# Patient Record
Sex: MALE | Race: WHITE | NOT HISPANIC OR LATINO | Employment: OTHER | URBAN - METROPOLITAN AREA
[De-identification: names, ages, dates, MRNs, and addresses within clinical notes are randomized per-mention and may not be internally consistent; named-entity substitution may affect disease eponyms.]

---

## 2023-07-13 DIAGNOSIS — N18.32 STAGE 3B CHRONIC KIDNEY DISEASE: Primary | ICD-10-CM

## 2023-07-13 RX ORDER — GABAPENTIN 100 MG/1
100 CAPSULE ORAL 2 TIMES DAILY
COMMUNITY

## 2023-07-13 RX ORDER — METOPROLOL TARTRATE 25 MG/1
25 TABLET, FILM COATED ORAL 2 TIMES DAILY
COMMUNITY

## 2023-07-13 RX ORDER — FUROSEMIDE 20 MG/1
20 TABLET ORAL DAILY
COMMUNITY

## 2023-07-13 RX ORDER — TAMSULOSIN HYDROCHLORIDE 0.4 MG/1
0.4 CAPSULE ORAL DAILY
COMMUNITY

## 2023-07-13 RX ORDER — PRIMIDONE 50 MG/1
1 TABLET ORAL DAILY
COMMUNITY
Start: 2023-02-13

## 2023-07-13 RX ORDER — ROSUVASTATIN CALCIUM 5 MG/1
5 TABLET, COATED ORAL DAILY
COMMUNITY

## 2023-07-13 RX ORDER — ASPIRIN 81 MG/1
81 TABLET ORAL DAILY
COMMUNITY

## 2023-07-13 RX ORDER — AMIODARONE HYDROCHLORIDE 100 MG/1
100 TABLET ORAL DAILY
COMMUNITY
Start: 2023-02-13

## 2023-07-13 RX ORDER — DAPAGLIFLOZIN 5 MG/1
5 TABLET, FILM COATED ORAL DAILY
COMMUNITY

## 2023-07-13 RX ORDER — CLOPIDOGREL BISULFATE 75 MG/1
75 TABLET ORAL DAILY
COMMUNITY

## 2023-07-13 RX ORDER — EZETIMIBE 10 MG/1
10 TABLET ORAL DAILY
COMMUNITY

## 2023-10-17 ENCOUNTER — TELEPHONE (OUTPATIENT)
Dept: NEPHROLOGY | Facility: CLINIC | Age: 83
End: 2023-10-17
Payer: MEDICARE

## 2023-12-11 ENCOUNTER — OFFICE VISIT (OUTPATIENT)
Dept: NEPHROLOGY | Facility: CLINIC | Age: 83
End: 2023-12-11
Payer: MEDICARE

## 2023-12-11 VITALS
HEART RATE: 55 BPM | HEIGHT: 63 IN | WEIGHT: 154 LBS | OXYGEN SATURATION: 97 % | BODY MASS INDEX: 27.29 KG/M2 | DIASTOLIC BLOOD PRESSURE: 75 MMHG | SYSTOLIC BLOOD PRESSURE: 133 MMHG

## 2023-12-11 DIAGNOSIS — N18.32 STAGE 3B CHRONIC KIDNEY DISEASE: Primary | ICD-10-CM

## 2023-12-11 PROCEDURE — 99203 PR OFFICE/OUTPT VISIT, NEW, LEVL III, 30-44 MIN: ICD-10-PCS | Mod: S$GLB,,, | Performed by: INTERNAL MEDICINE

## 2023-12-11 PROCEDURE — 99203 OFFICE O/P NEW LOW 30 MIN: CPT | Mod: S$GLB,,, | Performed by: INTERNAL MEDICINE

## 2023-12-11 RX ORDER — IPRATROPIUM BROMIDE 42 UG/1
2 SPRAY, METERED NASAL 3 TIMES DAILY
COMMUNITY
Start: 2023-10-13

## 2023-12-11 RX ORDER — EPINEPHRINE 0.22MG
30 AEROSOL WITH ADAPTER (ML) INHALATION DAILY
COMMUNITY

## 2023-12-11 RX ORDER — LANOLIN ALCOHOL/MO/W.PET/CERES
1 CREAM (GRAM) TOPICAL
COMMUNITY

## 2023-12-11 NOTE — PROGRESS NOTES
Nephrology Clinic Note           Pt Name:  Alma Rodriguez  Pt :  1940  Pt MRN:  [id number]    Date: 2023  Provider: Yordan Garrido      Chief Complaint: No chief complaint on file.      HPI:  Alma Rodriguez is a 83 y.o. 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  History is significant for HTN, CAD, s/p CABG, DM, afib. He is coming here for 1st time referred from his PCP for abnormal renal function. From chart review he has had abnormal renal function for 1 year at least. The patient declares that he knows for it for more than 1 year. Prolong h/o DM - recently with A1c - 8.4 referred to see a DM specialist, only on Farxiga, Metformin was recently stopped. Does not check Bp at home often but reports good control of it. No NSAIDS.  Frequent urination during the day. On Flomax and Lasix.   No swelling of his extremities.  Today in the office, no shortness of breath, no chest pain, no fever, no dysuria, no hematuria, no swelling of her legs, no diarrhea, or constipation          Review of Systems   Gen: no fever, chills, fatigue, weight loss/gain  HEENT: no vision changes, no hearing deficits, no nasal discharge  Respiratory: no cough, dyspnea  CVS: no chest pain, PND, orthopnea  Abd: no nausea, vomiting, abdominal pain, diarrhea, constipation  : no hematuria, dysuria, urinary retention  Ext: no edema, joint and muscle pains  Neuro: no weakness, no numbness  Skin: no rashes, no lesions  Psych: no depression, no anxiety    History:   Past Medical History:   Diagnosis Date    Diverticulosis     Essential (primary) hypertension     Internal hemorrhoids     Mixed hyperlipidemia     Personal history of colonic polyps 2011    adenomatous,hyperplastic    Personal history of colonic polyps 2008    hyperplastic    Personal history of colonic polyps 2018    2 Tubular adenoma    Type 2 diabetes mellitus without complications      Past Surgical History:   Procedure Laterality Date     APPENDECTOMY      CARDIAC SURGERY      CAROTID ENDARTERECTOMY Right 2019    COLONOSCOPY      6/07/2018, 4/12/2011, 3/4/2008    CORONARY STENT PLACEMENT  04/26/2011    TONSILLECTOMY       Family History   Problem Relation Age of Onset    Diabetes Mother     Diabetes Father      Social History     Substance and Sexual Activity   Alcohol Use Not Currently     Social History     Substance and Sexual Activity   Drug Use Not Currently     Social History     Substance and Sexual Activity   Sexual Activity Not on file     has no history on file for sexual activity.  Social History     Tobacco Use   Smoking Status Never   Smokeless Tobacco Never       Allergies:  Review of patient's allergies indicates:  No Known Allergies    [unfilled]       Physical Exam  Vitals:   There were no vitals filed for this visit.  There is no height or weight on file to calculate BMI.    Vital signs:   Wt Readings from Last 3 Encounters:   No data found for Wt     Temp Readings from Last 3 Encounters:   No data found for Temp     BP Readings from Last 3 Encounters:   No data found for BP     Pulse Readings from Last 3 Encounters:   No data found for Pulse       Physical Exam    GENERAL ASSESSMENT: awake and alert in no acute distress.  Eyes: anicteric sclera, no erythema or discharge.  HENT: Normocephalic, atraumatic, moist mucus membranes  Neck: Supple, no thyromegaly or lymphadenopathy   SKIN EXAM: no rash, ecchymosis.  Cardiovascular: regular rate and rhythm, S1 S2 heard. No  murmurs, rubs or gallops.  Respiratory: no   rales, no wheezes or rhonchi  GI: BS+, NT, ND, no organomegaly.  : no oglesby   MSK: no  edema. No joint stiffness, effusions  NEURO: alert and oriented,  PSYCH: answers questions appropriately, organized thinking   Nails: no  clubbing, no  pallor      Labs/Tests:  Lab Results   Component Value Date     05/23/2022    K 4.8 05/23/2022     05/23/2022    CO2 30 05/23/2022    BUN 61 (H) 05/23/2022    CREATININE 1.54 (H)  05/23/2022    CREATININE 1.68 (H) 02/17/2022    CREATININE 1.30 08/13/2021    GLUCOSE 4+ (A) 12/08/2023    CALCIUM 9.5 05/23/2022    PHOSPHORUS 3.0 07/12/2021    ALBUMIN 4.0 05/23/2022    EGFRNONAA 42 (L) 05/23/2022    EGFRNONAA 38 (L) 02/17/2022    EGFRNONAA 52 (L) 08/13/2021    ESTGFRAFRICA 49 (L) 05/23/2022    ESTGFRAFRICA 44 (L) 02/17/2022    ESTGFRAFRICA 60 (L) 08/13/2021     (H) 12/08/2023    HGBA1C 8.4 (H) 11/14/2023    IRON 66 12/02/2022    TIBC 366 12/02/2022    TRIG 80 08/15/2023    CHOL 125 08/15/2023    HDL 46 08/15/2023    LDLCALC 63 08/15/2023    LABVLDL 16 08/15/2023    WEWOKXVO54UC 44.3 07/06/2021                                 Outside labs/imagingnone      Renal US none  Impression:    Assessment & Plan:        Visit Diagnosis  No diagnosis found.      Plan    DM - uncontrol, already ferred to see a Dm specialist, infromed that uncontrol DM is associated with DM and its progression. On farxiga already - talked about the water intake.  HTN - well control. May need to be on ace in the future  CKD - satge 3a to 3b - risk factors that are associated with CKD and its progression discussed at length with the patient, on farxiga. To follow up with his PCP    Orders this visit   No orders of the defined types were placed in this encounter.         Follow Up:   No follow-ups on file.    Yordan Garrido M.D.  Nephrology  12/11/2023  1:12 PM